# Patient Record
Sex: FEMALE | Race: WHITE | URBAN - METROPOLITAN AREA
[De-identification: names, ages, dates, MRNs, and addresses within clinical notes are randomized per-mention and may not be internally consistent; named-entity substitution may affect disease eponyms.]

---

## 2020-10-06 ENCOUNTER — OFFICE VISIT (OUTPATIENT)
Dept: URBAN - METROPOLITAN AREA CLINIC 54 | Facility: CLINIC | Age: 80
End: 2020-10-06
Payer: MEDICARE

## 2020-10-06 DIAGNOSIS — H26.493 OTHER SECONDARY CATARACT, BILATERAL: ICD-10-CM

## 2020-10-06 PROCEDURE — 92134 CPTRZ OPH DX IMG PST SGM RTA: CPT | Performed by: OPHTHALMOLOGY

## 2020-10-06 PROCEDURE — 92014 COMPRE OPH EXAM EST PT 1/>: CPT | Performed by: OPHTHALMOLOGY

## 2020-10-06 PROCEDURE — 67028 INJECTION EYE DRUG: CPT | Performed by: OPHTHALMOLOGY

## 2020-10-06 ASSESSMENT — INTRAOCULAR PRESSURE
OD: 17
OS: 17

## 2020-10-06 NOTE — IMPRESSION/PLAN
Impression: Other secondary cataract, bilateral: H26.493. Plan: Developing PCO OU. May benefit from Yag laser in left eye. Follow up with Dr. Bismark Mccracken.

## 2020-10-06 NOTE — IMPRESSION/PLAN
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 OD. Plan: Exam/OCT reveal chronic macular scarring OD. No further treatment recommended for OD at this time.

## 2020-10-06 NOTE — IMPRESSION/PLAN
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 OS. Plan: Last treated with an Avastin injection OS 12 weeks ago. Exam/OCT reveals no IRF/SRF/SRH OS. Condition remains stable with 12 week treatment interval.  Recommend continuing treatment to maintain the NV AMD.  Avastin administered OS. Return in 12 weeks, OCT OU, re-eval Avastin OS

## 2021-01-05 ENCOUNTER — OFFICE VISIT (OUTPATIENT)
Dept: URBAN - METROPOLITAN AREA CLINIC 54 | Facility: CLINIC | Age: 81
End: 2021-01-05
Payer: MEDICARE

## 2021-01-05 PROCEDURE — 92134 CPTRZ OPH DX IMG PST SGM RTA: CPT | Performed by: OPHTHALMOLOGY

## 2021-01-05 PROCEDURE — 92014 COMPRE OPH EXAM EST PT 1/>: CPT | Performed by: OPHTHALMOLOGY

## 2021-01-05 PROCEDURE — 67028 INJECTION EYE DRUG: CPT | Performed by: OPHTHALMOLOGY

## 2021-01-05 ASSESSMENT — INTRAOCULAR PRESSURE
OS: 21
OD: 20

## 2021-01-05 NOTE — IMPRESSION/PLAN
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 OS. Plan: Last treated with an Avastin injection OS 13 weeks ago. Exam/OCT reveals no IRF/SRF/SRH OS. Condition remains stable with 12 week treatment interval.  Recommend continuing treatment to maintain the NV AMD.  Avastin administered OS. Return in 12 weeks, OCT OU, re-eval Avastin OS

## 2021-04-06 ENCOUNTER — OFFICE VISIT (OUTPATIENT)
Dept: URBAN - METROPOLITAN AREA CLINIC 54 | Facility: CLINIC | Age: 81
End: 2021-04-06
Payer: MEDICARE

## 2021-04-06 DIAGNOSIS — H35.3222 EXUDATIVE AGE-RELATED MACULAR DEGENERATION, LEFT EYE, WITH INACTIVE CHOROIDAL NEOVASCULARIZATION: Primary | ICD-10-CM

## 2021-04-06 DIAGNOSIS — H35.3221 EXUDATIVE AGE-RELATED MACULAR DEGENERATION, LEFT EYE, WITH ACTIVE CHOROIDAL NEOVASCULARIZATION: ICD-10-CM

## 2021-04-06 PROCEDURE — 67028 INJECTION EYE DRUG: CPT | Performed by: OPHTHALMOLOGY

## 2021-04-06 PROCEDURE — 92014 COMPRE OPH EXAM EST PT 1/>: CPT | Performed by: OPHTHALMOLOGY

## 2021-04-06 PROCEDURE — 92134 CPTRZ OPH DX IMG PST SGM RTA: CPT | Performed by: OPHTHALMOLOGY

## 2021-04-06 ASSESSMENT — INTRAOCULAR PRESSURE
OD: 19
OS: 19

## 2021-04-06 NOTE — IMPRESSION/PLAN
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 OS. Plan: Last treated with an Avastin injection OS 13 weeks ago. Exam/OCT reveals minimal SRF OS. Condition remains stable with 12 week treatment interval.  Recommend continuing treatment to maintain the NV AMD.  Avastin administered OS. Return in 12 weeks, OCT OU, re-eval Avastin OS

## 2021-07-06 ENCOUNTER — OFFICE VISIT (OUTPATIENT)
Dept: URBAN - METROPOLITAN AREA CLINIC 54 | Facility: CLINIC | Age: 81
End: 2021-07-06
Payer: MEDICARE

## 2021-07-06 DIAGNOSIS — Z96.1 PRESENCE OF PSEUDOPHAKIA: ICD-10-CM

## 2021-07-06 PROCEDURE — 92134 CPTRZ OPH DX IMG PST SGM RTA: CPT | Performed by: OPHTHALMOLOGY

## 2021-07-06 PROCEDURE — 67028 INJECTION EYE DRUG: CPT | Performed by: OPHTHALMOLOGY

## 2021-07-06 PROCEDURE — 92014 COMPRE OPH EXAM EST PT 1/>: CPT | Performed by: OPHTHALMOLOGY

## 2021-07-06 ASSESSMENT — INTRAOCULAR PRESSURE
OD: 18
OS: 17

## 2021-07-06 NOTE — IMPRESSION/PLAN
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 OS. Plan: Last treated with an Avastin injection OS 13 weeks ago. Exam/OCT reveals minimal SRF OS. Condition remains stable with 12 week treatment interval.  Recommend continuing treatment to maintain the NV AMD.  Avastin administered OS. 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   Continue 12 week treatment interval.  

Return in 12 weeks, OCT OU, re-eval Avastin OS

## 2021-10-05 ENCOUNTER — OFFICE VISIT (OUTPATIENT)
Dept: URBAN - METROPOLITAN AREA CLINIC 54 | Facility: CLINIC | Age: 81
End: 2021-10-05
Payer: MEDICARE

## 2021-10-05 DIAGNOSIS — H35.3213 EXUDATIVE AGE-REL MCLR DEGN, RIGHT EYE, WITH INACTIVE SCAR: ICD-10-CM

## 2021-10-05 PROCEDURE — 67028 INJECTION EYE DRUG: CPT | Performed by: OPHTHALMOLOGY

## 2021-10-05 PROCEDURE — 92134 CPTRZ OPH DX IMG PST SGM RTA: CPT | Performed by: OPHTHALMOLOGY

## 2021-10-05 PROCEDURE — 92014 COMPRE OPH EXAM EST PT 1/>: CPT | Performed by: OPHTHALMOLOGY

## 2021-10-05 ASSESSMENT — INTRAOCULAR PRESSURE
OS: 13
OD: 12

## 2022-01-11 ENCOUNTER — OFFICE VISIT (OUTPATIENT)
Dept: URBAN - METROPOLITAN AREA CLINIC 54 | Facility: CLINIC | Age: 82
End: 2022-01-11
Payer: MEDICARE

## 2022-01-11 PROCEDURE — 92014 COMPRE OPH EXAM EST PT 1/>: CPT | Performed by: OPHTHALMOLOGY

## 2022-01-11 PROCEDURE — 92134 CPTRZ OPH DX IMG PST SGM RTA: CPT | Performed by: OPHTHALMOLOGY

## 2022-01-11 PROCEDURE — 67028 INJECTION EYE DRUG: CPT | Performed by: OPHTHALMOLOGY

## 2022-01-11 ASSESSMENT — INTRAOCULAR PRESSURE
OS: 12
OD: 16

## 2022-01-11 NOTE — IMPRESSION/PLAN
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 OS. Plan: Last treated with an Avastin injection OS 14 weeks ago on 10/5/21 (1st time). Exam/OCT reveals no IRF/SRF/SRH OS. Condition remains stable. Recommend continuing treatment to maintain the NV AMD.  Avastin administered OS. 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   Continue 12 week treatment interval.  

Return in 12 weeks, OCT OU, re-eval Avastin OS

## 2022-04-12 ENCOUNTER — OFFICE VISIT (OUTPATIENT)
Dept: URBAN - METROPOLITAN AREA CLINIC 54 | Facility: CLINIC | Age: 82
End: 2022-04-12
Payer: MEDICARE

## 2022-04-12 DIAGNOSIS — H35.3221 EXUDATIVE AGE-RELATED MACULAR DEGENERATION, LEFT EYE, WITH ACTIVE CHOROIDAL NEOVASCULARIZATION: Primary | ICD-10-CM

## 2022-04-12 DIAGNOSIS — Z96.1 PRESENCE OF PSEUDOPHAKIA: ICD-10-CM

## 2022-04-12 DIAGNOSIS — H35.3213 EXUDATIVE AGE-REL MCLR DEGN, RIGHT EYE, WITH INACTIVE SCAR: ICD-10-CM

## 2022-04-12 DIAGNOSIS — H35.3222 EXUDATIVE AGE-RELATED MACULAR DEGENERATION, LEFT EYE, WITH INACTIVE CHOROIDAL NEOVASCULARIZATION: ICD-10-CM

## 2022-04-12 PROCEDURE — 67028 INJECTION EYE DRUG: CPT | Performed by: OPHTHALMOLOGY

## 2022-04-12 PROCEDURE — 92014 COMPRE OPH EXAM EST PT 1/>: CPT | Performed by: OPHTHALMOLOGY

## 2022-04-12 PROCEDURE — 92134 CPTRZ OPH DX IMG PST SGM RTA: CPT | Performed by: OPHTHALMOLOGY

## 2022-04-12 NOTE — IMPRESSION/PLAN
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 OS. Plan: Last treated with an Avastin injection OS 13 weeks ago on 1/11/22. Exam/OCT reveals no IRF/SRF/SRH OS. Condition remains stable. Recommend continuing treatment to maintain the NV AMD.  Avastin administered OS. 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   Continue 12 week treatment interval.  

Return in 12 weeks, OCT OU, re-eval Avastin OS

## 2022-07-19 ENCOUNTER — OFFICE VISIT (OUTPATIENT)
Dept: URBAN - METROPOLITAN AREA CLINIC 54 | Facility: CLINIC | Age: 82
End: 2022-07-19
Payer: MEDICARE

## 2022-07-19 DIAGNOSIS — H35.3222 EXDTVE AGE-REL MCLR DEGN, LEFT EYE, WITH INACT CHRDL NEOVAS: Primary | ICD-10-CM

## 2022-07-19 DIAGNOSIS — Z96.1 PRESENCE OF PSEUDOPHAKIA: ICD-10-CM

## 2022-07-19 DIAGNOSIS — H35.3213 EXUDATIVE AGE-REL MCLR DEGN, RIGHT EYE, WITH INACTIVE SCAR: ICD-10-CM

## 2022-07-19 PROCEDURE — 92014 COMPRE OPH EXAM EST PT 1/>: CPT | Performed by: OPHTHALMOLOGY

## 2022-07-19 PROCEDURE — 92134 CPTRZ OPH DX IMG PST SGM RTA: CPT | Performed by: OPHTHALMOLOGY

## 2022-07-19 PROCEDURE — 67028 INJECTION EYE DRUG: CPT | Performed by: OPHTHALMOLOGY

## 2022-07-19 ASSESSMENT — INTRAOCULAR PRESSURE
OD: 19
OS: 18

## 2022-10-11 ENCOUNTER — OFFICE VISIT (OUTPATIENT)
Dept: URBAN - METROPOLITAN AREA CLINIC 54 | Facility: CLINIC | Age: 82
End: 2022-10-11
Payer: MEDICARE

## 2022-10-11 DIAGNOSIS — H35.3213 EXUDATIVE AGE-REL MCLR DEGN, RIGHT EYE, WITH INACTIVE SCAR: ICD-10-CM

## 2022-10-11 DIAGNOSIS — H35.3222 EXUDATIVE AGE-RELATED MACULAR DEGENERATION, LEFT EYE, WITH INACTIVE CHOROIDAL NEOVASCULARIZATION: Primary | ICD-10-CM

## 2022-10-11 DIAGNOSIS — Z96.1 PRESENCE OF PSEUDOPHAKIA: ICD-10-CM

## 2022-10-11 PROCEDURE — 92014 COMPRE OPH EXAM EST PT 1/>: CPT | Performed by: OPHTHALMOLOGY

## 2022-10-11 PROCEDURE — 67028 INJECTION EYE DRUG: CPT | Performed by: OPHTHALMOLOGY

## 2022-10-11 PROCEDURE — 92134 CPTRZ OPH DX IMG PST SGM RTA: CPT | Performed by: OPHTHALMOLOGY

## 2022-10-11 ASSESSMENT — INTRAOCULAR PRESSURE
OD: 15
OS: 16

## 2022-10-11 NOTE — IMPRESSION/PLAN
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 OS. Plan: Last treated with an Avastin injection OS 12 weeks ago on 7/19/22. Exam/OCT reveals no IRF/SRF/SRH OS. Condition remains stable. Recommend continuing treatment to maintain the NV AMD.  Avastin administered OS. 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   Continue 12 week treatment interval.  

Return in 4 months, OCT OU, re-eval Avastin OS

## 2023-01-10 ENCOUNTER — OFFICE VISIT (OUTPATIENT)
Dept: URBAN - METROPOLITAN AREA CLINIC 54 | Facility: CLINIC | Age: 83
End: 2023-01-10
Payer: MEDICARE

## 2023-01-10 DIAGNOSIS — H35.3213 EXUDATIVE AGE-REL MCLR DEGN, RIGHT EYE, WITH INACTIVE SCAR: ICD-10-CM

## 2023-01-10 DIAGNOSIS — Z96.1 PRESENCE OF PSEUDOPHAKIA: ICD-10-CM

## 2023-01-10 DIAGNOSIS — H35.3222 EXDTVE AGE-REL MCLR DEGN, LEFT EYE, WITH INACT CHRDL NEOVAS: Primary | ICD-10-CM

## 2023-01-10 PROCEDURE — 92014 COMPRE OPH EXAM EST PT 1/>: CPT | Performed by: OPHTHALMOLOGY

## 2023-01-10 PROCEDURE — 67028 INJECTION EYE DRUG: CPT | Performed by: OPHTHALMOLOGY

## 2023-01-10 PROCEDURE — 92134 CPTRZ OPH DX IMG PST SGM RTA: CPT | Performed by: OPHTHALMOLOGY

## 2023-01-10 ASSESSMENT — INTRAOCULAR PRESSURE
OD: 16
OS: 15

## 2023-01-10 NOTE — IMPRESSION/PLAN
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 OS. Plan: Last treated with an Avastin injection OS 13 weeks ago on 10/11/22. Exam/OCT reveals no IRF/SRF/SRH OS. Condition remains stable. Recommend continuing treatment to maintain the NV AMD.  Avastin administered OS. 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   Continue 12 week treatment interval.  

Return in 12-13 weeks, OCT OU, re-eval Avastin OS

## 2023-03-27 ENCOUNTER — OFFICE VISIT (OUTPATIENT)
Dept: URBAN - METROPOLITAN AREA CLINIC 54 | Facility: CLINIC | Age: 83
End: 2023-03-27
Payer: MEDICARE

## 2023-03-27 DIAGNOSIS — H35.3222 EXDTVE AGE-REL MCLR DEGN, LEFT EYE, WITH INACT CHRDL NEOVAS: ICD-10-CM

## 2023-03-27 DIAGNOSIS — H43.12 VITREOUS HEMORRHAGE, LEFT EYE: Primary | ICD-10-CM

## 2023-03-27 DIAGNOSIS — H35.3213 EXUDATIVE AGE-REL MCLR DEGN, RIGHT EYE, WITH INACTIVE SCAR: ICD-10-CM

## 2023-03-27 PROCEDURE — 92134 CPTRZ OPH DX IMG PST SGM RTA: CPT | Performed by: OPHTHALMOLOGY

## 2023-03-27 PROCEDURE — 76512 OPH US DX B-SCAN: CPT | Performed by: OPHTHALMOLOGY

## 2023-03-27 PROCEDURE — 99213 OFFICE O/P EST LOW 20 MIN: CPT | Performed by: OPHTHALMOLOGY

## 2023-03-27 ASSESSMENT — INTRAOCULAR PRESSURE
OS: 22
OD: 28

## 2023-03-27 NOTE — IMPRESSION/PLAN
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 OS. Plan: Last treated with an Avastin injection OS 1/10/23 with [facility]. Exam/OCT reveals no view OU.

## 2023-03-27 NOTE — IMPRESSION/PLAN
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 OD. Plan: Exam reveals chronic macular scarring OD. No further treatment recommended for OD at this time.

## 2023-03-27 NOTE — IMPRESSION/PLAN
Impression: Vitreous hemorrhage, left eye: H43.12. Plan: She complains of worse vision OS x3 days and has new vitreous hemorrhage and poor view OS. OCT today shows no view OU. B-scan OS today shows: large presumably subretinal hemorrhage vs scar but no RD. We discussed the findings and options of avastin. She has an appointment with Dr. Cruz Vidal in 2 weeks and will consider avastin then. We discussed the role of PPV if the hemorrhage does not clear.   
RTC 2 weeks OCT OU; poss avastin OS with ROSANNE

## 2023-04-11 ENCOUNTER — OFFICE VISIT (OUTPATIENT)
Dept: URBAN - METROPOLITAN AREA CLINIC 54 | Facility: CLINIC | Age: 83
End: 2023-04-11
Payer: MEDICARE

## 2023-04-11 DIAGNOSIS — H43.12 VITREOUS HEMORRHAGE, LEFT EYE: Primary | ICD-10-CM

## 2023-04-11 DIAGNOSIS — H35.3213 EXUDATIVE AGE-REL MCLR DEGN, RIGHT EYE, WITH INACTIVE SCAR: ICD-10-CM

## 2023-04-11 DIAGNOSIS — H35.3222 EXDTVE AGE-REL MCLR DEGN, LEFT EYE, WITH INACT CHRDL NEOVAS: ICD-10-CM

## 2023-04-11 PROCEDURE — 99213 OFFICE O/P EST LOW 20 MIN: CPT | Performed by: OPHTHALMOLOGY

## 2023-04-11 PROCEDURE — 67028 INJECTION EYE DRUG: CPT | Performed by: OPHTHALMOLOGY

## 2023-04-11 PROCEDURE — 92134 CPTRZ OPH DX IMG PST SGM RTA: CPT | Performed by: OPHTHALMOLOGY

## 2023-04-11 ASSESSMENT — INTRAOCULAR PRESSURE
OS: 22
OD: 24

## 2023-04-11 NOTE — IMPRESSION/PLAN
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 OS. Plan: Last treated with an Avastin injection OS 13 weeks ago on 1/10/23. Exam/OCT reveals VH OS as above. Recommend continuing treatment to prevent further bleeding. Avastin administered OS.

## 2023-04-11 NOTE — IMPRESSION/PLAN
Impression: Vitreous hemorrhage, left eye: H43.12. Plan: Persistent VH OS. Disc and vessels can be visualized. Discussed options (observation, PPV). Patient elects observation for now to see if [facility] will clear with time.

## 2023-07-17 ENCOUNTER — OFFICE VISIT (OUTPATIENT)
Dept: URBAN - METROPOLITAN AREA CLINIC 54 | Facility: CLINIC | Age: 83
End: 2023-07-17
Payer: MEDICARE

## 2023-07-17 DIAGNOSIS — H43.12 VITREOUS HEMORRHAGE, LEFT EYE: ICD-10-CM

## 2023-07-17 DIAGNOSIS — H35.3222 EXDTVE AGE-REL MCLR DEGN, LEFT EYE, WITH INACT CHRDL NEOVAS: Primary | ICD-10-CM

## 2023-07-17 DIAGNOSIS — H35.3213 EXUDATIVE AGE-RELATED MACULAR DEGENERATION, RIGHT EYE, WITH INACTIVE SCAR: ICD-10-CM

## 2023-07-17 PROCEDURE — 67028 INJECTION EYE DRUG: CPT | Performed by: OPHTHALMOLOGY

## 2023-07-17 PROCEDURE — 92014 COMPRE OPH EXAM EST PT 1/>: CPT | Performed by: OPHTHALMOLOGY

## 2023-07-17 PROCEDURE — 92134 CPTRZ OPH DX IMG PST SGM RTA: CPT | Performed by: OPHTHALMOLOGY

## 2023-07-17 ASSESSMENT — INTRAOCULAR PRESSURE
OD: 17
OS: 14

## 2023-07-17 NOTE — IMPRESSION/PLAN
Impression: Vitreous hemorrhage, left eye: H43.12. Plan: Persistent VH OS. Disc and vessels can be visualized. Discussed options (observation, PPV). Patient elects observation for now to see if [facility] will clear with time.

## 2023-07-17 NOTE — IMPRESSION/PLAN
Impression: Exdtve age-rel mclr degn, left eye, with inact chrdl neovas: H35.1482. OS. Plan: Last treated with an Avastin injection OS 14 weeks ago on 4/11/23. Exam/OCT reveals VH OS has improved. Recommend continuing treatment to prevent further bleeding. Will plan on continuing treatment intervals at 12 weeks to prevent further bleeding. Avastin administered OS. 

RTC: 12 weeks, OCT OU, Reeval Avastin OS

## 2023-10-30 ENCOUNTER — OFFICE VISIT (OUTPATIENT)
Dept: URBAN - METROPOLITAN AREA CLINIC 54 | Facility: CLINIC | Age: 83
End: 2023-10-30
Payer: MEDICARE

## 2023-10-30 DIAGNOSIS — H43.12 VITREOUS HEMORRHAGE, LEFT EYE: ICD-10-CM

## 2023-10-30 DIAGNOSIS — H35.3222 EXDTVE AGE-REL MCLR DEGN, LEFT EYE, WITH INACT CHRDL NEOVAS: Primary | ICD-10-CM

## 2023-10-30 DIAGNOSIS — H35.3213 EXUDATIVE AGE-REL MCLR DEGN, RIGHT EYE, WITH INACTIVE SCAR: ICD-10-CM

## 2023-10-30 PROCEDURE — 92134 CPTRZ OPH DX IMG PST SGM RTA: CPT | Performed by: OPHTHALMOLOGY

## 2023-10-30 PROCEDURE — 92014 COMPRE OPH EXAM EST PT 1/>: CPT | Performed by: OPHTHALMOLOGY

## 2023-10-30 PROCEDURE — 67028 INJECTION EYE DRUG: CPT | Performed by: OPHTHALMOLOGY

## 2023-10-30 ASSESSMENT — INTRAOCULAR PRESSURE
OD: 15
OS: 17